# Patient Record
Sex: MALE | Race: WHITE | NOT HISPANIC OR LATINO | Employment: FULL TIME | URBAN - METROPOLITAN AREA
[De-identification: names, ages, dates, MRNs, and addresses within clinical notes are randomized per-mention and may not be internally consistent; named-entity substitution may affect disease eponyms.]

---

## 2022-04-06 ENCOUNTER — OFFICE VISIT (OUTPATIENT)
Dept: FAMILY MEDICINE | Facility: CLINIC | Age: 22
End: 2022-04-06
Payer: COMMERCIAL

## 2022-04-06 VITALS
WEIGHT: 183.5 LBS | OXYGEN SATURATION: 97 % | TEMPERATURE: 98.6 F | HEIGHT: 68 IN | HEART RATE: 70 BPM | DIASTOLIC BLOOD PRESSURE: 66 MMHG | BODY MASS INDEX: 27.81 KG/M2 | SYSTOLIC BLOOD PRESSURE: 118 MMHG

## 2022-04-06 DIAGNOSIS — R21 RASH AND NONSPECIFIC SKIN ERUPTION: Primary | ICD-10-CM

## 2022-04-06 PROCEDURE — 99203 OFFICE O/P NEW LOW 30 MIN: CPT | Performed by: PHYSICIAN ASSISTANT

## 2022-04-06 RX ORDER — PREDNISONE 20 MG/1
20 TABLET ORAL DAILY
Qty: 7 TABLET | Refills: 0 | Status: SHIPPED | OUTPATIENT
Start: 2022-04-06 | End: 2022-06-03

## 2022-04-06 RX ORDER — CETIRIZINE HYDROCHLORIDE 10 MG/1
10 TABLET ORAL DAILY
Qty: 10 TABLET | Refills: 1 | Status: SHIPPED | OUTPATIENT
Start: 2022-04-06 | End: 2022-08-01

## 2022-04-06 ASSESSMENT — ENCOUNTER SYMPTOMS
CONSTITUTIONAL NEGATIVE: 1
EYE REDNESS: 1
EYE ITCHING: 1

## 2022-04-06 NOTE — PROGRESS NOTES
"  Assessment & Plan     Rash and nonspecific skin eruption  Worsening  - cetirizine (ZYRTEC) 10 MG tablet  Dispense: 10 tablet; Refill: 1  - predniSONE (DELTASONE) 20 MG tablet  Dispense: 7 tablet; Refill: 0    Follow-up if not gradually improved, or if worsening.           BMI:   Estimated body mass index is 27.9 kg/m  as calculated from the following:    Height as of this encounter: 1.727 m (5' 8\").    Weight as of this encounter: 83.2 kg (183 lb 8 oz).           No follow-ups on file.    ADITYA Guillen  [facility]    Christ Sargent is a 21 year old who presents for the following health issues     1-year-old presents to the clinic with complaint of foot skin rash  Its been present upwards of a month  Started in the axillae is pretty much clear there are now withdrawn the neck in the scalp and on the upper eyelids  He has not been sick he has had no fever no chills  No new contacts he thought maybe it was from a deodorant he was using  He has never had problems like this before  No contacts with similar rash    History of Present Illness       Reason for visit:  Rash  Symptom onset:  More than a month  Symptoms include:  Itchy, red, flaky skin patches  Symptom intensity:  Moderate  Symptom progression:  Worsening  Had these symptoms before:  No    He eats 2-3 servings of fruits and vegetables daily.He consumes 0 sweetened beverage(s) daily.He exercises with enough effort to increase his heart rate 60 or more minutes per day.  He exercises with enough effort to increase his heart rate 6 days per week.        Rash  Onset/Duration: couple of weeks  Description  Location: neck, eyelids, behind ears  Character: flakey  Itching: mild  Intensity:  mild  Progression of Symptoms:  worsening  Accompanying signs and symptoms:   Fever: no  Body aches or joint pain: no  Sore throat symptoms: no  Recent cold symptoms: no  History:           Previous episodes of similar rash: None  New " "exposures:  None  Recent travel: YES, December 2021  Exposure to similar rash: no  Precipitating or alleviating factors:   Therapies tried and outcome: hydrocortisone cream -          Review of Systems   Constitutional: Negative.    HENT: Negative.    Eyes: Positive for redness and itching.   Skin: Positive for rash.            Objective    /66 (BP Location: Right arm)   Pulse 70   Temp 98.6  F (37  C) (Tympanic)   Ht 1.727 m (5' 8\")   Wt 83.2 kg (183 lb 8 oz)   SpO2 97%   BMI 27.90 kg/m    Body mass index is 27.9 kg/m .  Physical Exam erythematous macular eruption on the superior eyelids left greater than right more medial than lateral there is no flaking similar erythematous macular eruption in the scalp in the postauricular areas on the neck minimal right axilla none in the left axilla not on the trunk or extremities                 "

## 2022-05-29 ENCOUNTER — HEALTH MAINTENANCE LETTER (OUTPATIENT)
Age: 22
End: 2022-05-29

## 2022-06-03 ENCOUNTER — OFFICE VISIT (OUTPATIENT)
Dept: FAMILY MEDICINE | Facility: CLINIC | Age: 22
End: 2022-06-03
Payer: COMMERCIAL

## 2022-06-03 VITALS
BODY MASS INDEX: 26.47 KG/M2 | HEART RATE: 92 BPM | RESPIRATION RATE: 12 BRPM | OXYGEN SATURATION: 98 % | WEIGHT: 174.1 LBS | SYSTOLIC BLOOD PRESSURE: 142 MMHG | DIASTOLIC BLOOD PRESSURE: 72 MMHG

## 2022-06-03 DIAGNOSIS — L20.9 ATOPIC DERMATITIS, UNSPECIFIED TYPE: Primary | ICD-10-CM

## 2022-06-03 PROCEDURE — 99214 OFFICE O/P EST MOD 30 MIN: CPT | Performed by: PHYSICIAN ASSISTANT

## 2022-06-03 RX ORDER — PREDNISONE 20 MG/1
20 TABLET ORAL DAILY
Qty: 10 TABLET | Refills: 1 | Status: SHIPPED | OUTPATIENT
Start: 2022-06-03 | End: 2022-06-23

## 2022-06-03 ASSESSMENT — ENCOUNTER SYMPTOMS
NAUSEA: 0
FATIGUE: 0
ACTIVITY CHANGE: 0
CHILLS: 0
HEADACHES: 0
SHORTNESS OF BREATH: 0
FEVER: 0
PALPITATIONS: 0
ABDOMINAL PAIN: 0
CONSTIPATION: 0
LIGHT-HEADEDNESS: 0
VOMITING: 0
COUGH: 0
DIARRHEA: 0
DIZZINESS: 0

## 2022-06-03 ASSESSMENT — PAIN SCALES - GENERAL: PAINLEVEL: NO PAIN (0)

## 2022-06-03 NOTE — PROGRESS NOTES
Progress Note  6/03/22     Chief Complaint   Patient presents with     Derm Problem     Itchy rash in armpits, neck, scalp, and eye lids. Rash gets scaly. Has tried cortisone 10 and antifungal. Was seen in [place] and got prescriptions. Prednisone almost made it go away but was done with prescription before it was completely gone.         ISABELA Fuentes is a pleasant  21 year old year old male  who present to the clinic today for evaluation on a rash.  He states he has had a rash for about 6 to 9 months.  He states he started in his right armpit and more recently it is now spread to the back of his neck and he has some lesions on his scalp.  He thought the rash was initially from a chemical burn from his deodorant but he has switched deodorants and the rash is still there.  He has not had any new soaps lotions or detergents.  No recent travel.  No new dog.  No fevers or chills.  No other associated symptoms with the rash.  He states the rash is very itchy.  He was prescribed prednisone and Claritin the rash only is fully improved with prednisone but once he stopped the prednisone the rash returned.    ROS    Review of Systems   Constitutional: Negative for activity change, chills, fatigue and fever.   HENT: Negative.    Respiratory: Negative for cough and shortness of breath.    Cardiovascular: Negative for chest pain and palpitations.   Gastrointestinal: Negative for abdominal pain, constipation, diarrhea, nausea and vomiting.   Skin: Positive for rash.        Itchy rash to right armpit, neck, and scalp.  Noticed approximately 6 to 9 months ago.   Neurological: Negative for dizziness, light-headedness and headaches.            There is no problem list on file for this patient.       No past medical history on file.       Social History     Socioeconomic History     Marital status: Single     Spouse name: Not on file     Number of children: Not on file     Years of education: Not on file     Highest  education level: Not on file   Occupational History     Not on file   Tobacco Use     Smoking status: Never Smoker     Smokeless tobacco: Never Used   Substance and Sexual Activity     Alcohol use: Not on file     Drug use: Not on file     Sexual activity: Not on file   Other Topics Concern     Not on file   Social History Narrative     Not on file     Social Determinants of Health     Financial Resource Strain: Not on file   Food Insecurity: Not on file   Transportation Needs: Not on file   Physical Activity: Not on file   Stress: Not on file   Social Connections: Not on file   Intimate Partner Violence: Not on file   Housing Stability: Not on file         No Known Allergies       Current Outpatient Medications   Medication     predniSONE (DELTASONE) 20 MG tablet     cetirizine (ZYRTEC) 10 MG tablet     predniSONE (DELTASONE) 20 MG tablet     No current facility-administered medications for this visit.            BP (!) 142/72 (BP Location: Left arm, Patient Position: Sitting, Cuff Size: Adult Regular)   Pulse 92   Resp 12   Wt 79 kg (174 lb 1.6 oz)   SpO2 98%   BMI 26.47 kg/m       No results found for this or any previous visit (from the past 240 hour(s)).         No flowsheet data found.     Physical Exam:     Physical Exam  Vitals and nursing note reviewed.   Constitutional:       General: He is awake. He is not in acute distress.     Appearance: Normal appearance. He is well-developed and well-groomed. He is not ill-appearing, toxic-appearing or diaphoretic.   HENT:      Head: Normocephalic and atraumatic.   Eyes:      General: Lids are normal.         Right eye: No discharge.         Left eye: No discharge.      Conjunctiva/sclera: Conjunctivae normal.   Neck:      Trachea: Trachea normal.   Cardiovascular:      Rate and Rhythm: Normal rate and regular rhythm.      Heart sounds: S1 normal and S2 normal.     No friction rub. No gallop.   Pulmonary:      Effort: Pulmonary effort is normal. No accessory  muscle usage, prolonged expiration or respiratory distress.      Breath sounds: Normal breath sounds and air entry.   Musculoskeletal:      Cervical back: Full passive range of motion without pain and neck supple.   Lymphadenopathy:      Cervical: No cervical adenopathy.   Skin:     Comments: Erythematous none raised rash to right axilla, posterior neck.    Scaly plaque to posterior scalp bilaterally.   Neurological:      General: No focal deficit present.      Mental Status: He is alert and oriented to person, place, and time.      GCS: GCS eye subscore is 4. GCS verbal subscore is 5. GCS motor subscore is 6.      Cranial Nerves: Cranial nerves are intact.      Motor: No weakness.      Gait: Gait is intact.   Psychiatric:         Behavior: Behavior is cooperative.              Assessment/Plan:       ICD-10-CM    1. Atopic dermatitis, unspecified type  L20.9 predniSONE (DELTASONE) 20 MG tablet       #1 contact dermatitis- it does appear that he has a contact dermatitis to his right axillary region, and the back of his neck.  Etiology unclear.  The rash is erythematous and scaly.  Very itchy and does have some mild excoriation to it due to itching.  Since the prednisone did not improve I will prescribe him 40 mg x 5 days and with 1 refill if this returns within a week.  If he finishes the 5 mg I would like him to wait at least 5 to 7 days and then repeat another 40 mg x 5 days.  Also can continue Claritin or use topical Benadryl for the itching.    #2 psoriasis-the 2 lesions on the back of his head looks consistent with possibly psoriasis or severe eczema.  I was hoping that the prednisone helps cool this down as well.  He is not having any associated joint pain or other any other symptoms.    He is to monitor symptoms if symptoms worsen or do not improve he is to let me know.  Patient agreed to this plan.  Questions were answered       Marcelino Cook PA-C

## 2022-06-20 DIAGNOSIS — L20.9 ATOPIC DERMATITIS, UNSPECIFIED TYPE: Primary | ICD-10-CM

## 2022-06-24 ENCOUNTER — OFFICE VISIT (OUTPATIENT)
Dept: URGENT CARE | Facility: URGENT CARE | Age: 22
End: 2022-06-24
Payer: COMMERCIAL

## 2022-06-24 VITALS
RESPIRATION RATE: 14 BRPM | WEIGHT: 169.3 LBS | SYSTOLIC BLOOD PRESSURE: 118 MMHG | DIASTOLIC BLOOD PRESSURE: 58 MMHG | HEART RATE: 68 BPM | TEMPERATURE: 98.1 F | OXYGEN SATURATION: 99 % | BODY MASS INDEX: 25.74 KG/M2

## 2022-06-24 DIAGNOSIS — L20.9 ATOPIC DERMATITIS, UNSPECIFIED TYPE: ICD-10-CM

## 2022-06-24 DIAGNOSIS — R59.9 SWELLING OF LYMPH NODE: Primary | ICD-10-CM

## 2022-06-24 DIAGNOSIS — R21 RASH: ICD-10-CM

## 2022-06-24 PROCEDURE — 99214 OFFICE O/P EST MOD 30 MIN: CPT | Performed by: PHYSICIAN ASSISTANT

## 2022-06-24 PROCEDURE — 85025 COMPLETE CBC W/AUTO DIFF WBC: CPT | Performed by: PHYSICIAN ASSISTANT

## 2022-06-24 PROCEDURE — 36415 COLL VENOUS BLD VENIPUNCTURE: CPT | Performed by: PHYSICIAN ASSISTANT

## 2022-06-24 RX ORDER — CEPHALEXIN 500 MG/1
500 CAPSULE ORAL 4 TIMES DAILY
Qty: 28 CAPSULE | Refills: 0 | Status: SHIPPED | OUTPATIENT
Start: 2022-06-24 | End: 2022-07-01

## 2022-06-25 LAB
BASOPHILS # BLD AUTO: 0 10E3/UL (ref 0–0.2)
BASOPHILS NFR BLD AUTO: 0 %
EOSINOPHIL # BLD AUTO: 0.1 10E3/UL (ref 0–0.7)
EOSINOPHIL NFR BLD AUTO: 1 %
ERYTHROCYTE [DISTWIDTH] IN BLOOD BY AUTOMATED COUNT: 16.6 % (ref 10–15)
HCT VFR BLD AUTO: 45.2 % (ref 40–53)
HGB BLD-MCNC: 13.5 G/DL (ref 13.3–17.7)
IMM GRANULOCYTES # BLD: 0.1 10E3/UL
IMM GRANULOCYTES NFR BLD: 2 %
LYMPHOCYTES # BLD AUTO: 2.1 10E3/UL (ref 0.8–5.3)
LYMPHOCYTES NFR BLD AUTO: 24 %
MCH RBC QN AUTO: 20.8 PG (ref 26.5–33)
MCHC RBC AUTO-ENTMCNC: 29.9 G/DL (ref 31.5–36.5)
MCV RBC AUTO: 70 FL (ref 78–100)
MONOCYTES # BLD AUTO: 0.8 10E3/UL (ref 0–1.3)
MONOCYTES NFR BLD AUTO: 10 %
NEUTROPHILS # BLD AUTO: 5.3 10E3/UL (ref 1.6–8.3)
NEUTROPHILS NFR BLD AUTO: 63 %
NRBC # BLD AUTO: 0 10E3/UL
NRBC BLD AUTO-RTO: 0 /100
PLATELET # BLD AUTO: 153 10E3/UL (ref 150–450)
RBC # BLD AUTO: 6.49 10E6/UL (ref 4.4–5.9)
WBC # BLD AUTO: 8.5 10E3/UL (ref 4–11)

## 2022-06-25 NOTE — PROGRESS NOTES
Assessment & Plan     1. Swelling of lymph node  - CBC with platelets and differential; Future  - CBC with platelets and differential  - cephALEXin (KEFLEX) 500 MG capsule; Take 1 capsule (500 mg) by mouth 4 times daily for 7 days  Dispense: 28 capsule; Refill: 0    2. Atopic dermatitis, unspecified type      3. Scalp rash    Patient with scalp rash for months, has trialed prednisone, but does not appear to be helped. Now, for the past month or so, he has had lymph node swelling. He certainly has posterior auricular swelling, along with what appears to be occipital lymph nodes swelling. There is a chance that this is another skin lesion/mass/cyst. He has slight erythema of the scalp and lymph nodes are tender. Will treat with keflex for suspected infection. I suspect the rash is what is causing his lymph node swelling.  He has a history of lymph node swelling that have continued to be enlarged.  A WBC count was obtained and does not have evidence of lymphocytosis, anemia etc. Platelets were flagged, and lab is under slide review and has not been finalized.  Discussed obtaining imaging on the area, although US is somewhat difficult on the scalp, will defer to dermatology.   He has appointment scheduled, but it is not for several months.   If not improving with Keflex, advised follow-up with PCP in the following week for a recheck.         Return in about 1 week (around 7/1/2022) for Dermatology.    Diagnosis and treatment plan was reviewed with patient and/or family.   We went over any labs or imaging. Discussed worsening symptoms or little to no relief despite treatment plan to follow-up with PCP.  Patient verbalizes understanding. All questions were addressed and answered.     Gloria Spicer PA-C  [facility]    CHIEF COMPLAINT:   Chief Complaint   Patient presents with     Hair/Scalp Problem     Bumps on the back of his head, noticed month ago, 2 weeks ago started to be painful and get  jeana, also has rash  for 2 months that is moving to his neck     Subjective     Arie is a 21 year old male who presents to clinic today for evaluation of rash and lymph node swelling.  He has had a rash on the back of his neck and scalp for the past several months.  He has tried prednisone, steroid cream, antihistamines and antifungals for the rash. The cause of the rash is unclear. He has a follow-up appointment with dermatology.   For the past one month he has had lymph node swelling. Located behind his ears and at the base of his skull. Area is swollen and tender. He notes that when he was taking prednisone, that the swelling decreased He has not had fever, chills, recent illness, chest pain SOB. No unintentional weight loss.       No past medical history on file.  No past surgical history on file.  Social History     Tobacco Use     Smoking status: Never Smoker     Smokeless tobacco: Never Used   Substance Use Topics     Alcohol use: Not on file     Current Outpatient Medications   Medication     cephALEXin (KEFLEX) 500 MG capsule     cetirizine (ZYRTEC) 10 MG tablet     No current facility-administered medications for this visit.     No Known Allergies    10 point ROS of systems were all negative except for pertinent positives noted in my HPI.      Exam:   /58   Pulse 68   Temp 98.1  F (36.7  C) (Tympanic)   Resp 14   Wt 76.8 kg (169 lb 4.8 oz)   SpO2 99%   BMI 25.74 kg/m    Constitutional: healthy, alert and no distress  Head: Normocephalic, atraumatic.  Eyes: conjunctiva clear, no drainage  ENT: TMs clear and shiny caterina, nasal mucosa pink and moist, throat without tonsillar hypertrophy or erythema  Neck: neck is supple, +posterior cervical lymphadenopathy. NO nuchal rigidity  Lymph: He has swollen tender lymph nodes in posterior auricular, posterior cervical and occipital area. Nodes are mobile, scalp is slightly erythematous. Nodes approximately 1 cm in diameter and mobile.   Cardiovascular:  RRR  Respiratory: CTA bilaterally, no rhonchi or rales  Skin / scalp: scaly plaque on an erythematous base noted on scalp.   Neurologic: Speech clear, gait normal. Moves all extremities.    Results for orders placed or performed in visit on 06/24/22   CBC with platelets and differential     Status: None (In process)    Narrative    The following orders were created for panel order CBC with platelets and differential.  Procedure                               Abnormality         Status                     ---------                               -----------         ------                     CBC with platelets and d...[[id number]]                      In process                   Please view results for these tests on the individual orders.

## 2022-07-02 DIAGNOSIS — R59.1 LYMPHADENOPATHY: Primary | ICD-10-CM

## 2022-07-12 ENCOUNTER — MYC MEDICAL ADVICE (OUTPATIENT)
Dept: FAMILY MEDICINE | Facility: CLINIC | Age: 22
End: 2022-07-12

## 2022-07-12 DIAGNOSIS — R59.1 LYMPHADENOPATHY: Primary | ICD-10-CM

## 2022-07-13 ENCOUNTER — APPOINTMENT (OUTPATIENT)
Dept: URBAN - METROPOLITAN AREA CLINIC 260 | Age: 22
Setting detail: DERMATOLOGY
End: 2022-07-15

## 2022-07-13 VITALS — WEIGHT: 165 LBS | HEIGHT: 68 IN

## 2022-07-13 DIAGNOSIS — L21.8 OTHER SEBORRHEIC DERMATITIS: ICD-10-CM

## 2022-07-13 PROBLEM — L30.9 DERMATITIS, UNSPECIFIED: Status: ACTIVE | Noted: 2022-07-13

## 2022-07-13 PROCEDURE — 99204 OFFICE O/P NEW MOD 45 MIN: CPT

## 2022-07-13 PROCEDURE — OTHER PRESCRIPTION: OTHER

## 2022-07-13 PROCEDURE — OTHER PRESCRIPTION MEDICATION MANAGEMENT: OTHER

## 2022-07-13 PROCEDURE — OTHER MIPS QUALITY: OTHER

## 2022-07-13 PROCEDURE — OTHER COUNSELING: OTHER

## 2022-07-13 RX ORDER — KETOCONAZOLE 20 MG/ML
2% SHAMPOO, SUSPENSION TOPICAL QD
Qty: 120 | Refills: 2 | Status: CANCELLED | COMMUNITY
Start: 2022-07-13

## 2022-07-13 RX ORDER — DESONIDE 0.5 MG/G
0.05% OINTMENT TOPICAL BID
Qty: 15 | Refills: 0 | Status: ERX | COMMUNITY
Start: 2022-07-13

## 2022-07-13 RX ORDER — BETAMETHASONE DIPROPIONATE 0.5 MG/ML
0.05% LOTION TOPICAL TWICE DAILY
Qty: 60 | Refills: 1 | Status: ERX | COMMUNITY
Start: 2022-07-13

## 2022-07-13 ASSESSMENT — SEVERITY ASSESSMENT: HOW SEVERE IS THIS PATIENT'S CONDITION?: SEVERE

## 2022-07-13 ASSESSMENT — LOCATION SIMPLE DESCRIPTION DERM: LOCATION SIMPLE: RIGHT SCALP

## 2022-07-13 ASSESSMENT — LOCATION ZONE DERM: LOCATION ZONE: SCALP

## 2022-07-13 ASSESSMENT — LOCATION DETAILED DESCRIPTION DERM: LOCATION DETAILED: RIGHT MEDIAL FRONTAL SCALP

## 2022-07-13 NOTE — PROCEDURE: MIPS QUALITY
Quality 431: Preventive Care And Screening: Unhealthy Alcohol Use - Screening: Patient not identified as an unhealthy alcohol user when screened for unhealthy alcohol use using a systematic screening method
Quality 130: Documentation Of Current Medications In The Medical Record: Current Medications Documented
Detail Level: Detailed
Quality 110: Preventive Care And Screening: Influenza Immunization: Influenza Immunization Ordered or Recommended, but not Administered due to system reason
Quality 226: Preventive Care And Screening: Tobacco Use: Screening And Cessation Intervention: Patient screened for tobacco use, is a smoker AND received Cessation Counseling within the Previous 12 Months

## 2022-07-13 NOTE — PROCEDURE: PRESCRIPTION MEDICATION MANAGEMENT
Initiate Treatment: Ketoconazole 2% shampoo, Betamethasone lotion, and Desonide ointment
Render In Strict Bullet Format?: No
Detail Level: Simple
Plan: Return in 1 month for recheck

## 2022-07-14 ENCOUNTER — HOSPITAL ENCOUNTER (OUTPATIENT)
Dept: ULTRASOUND IMAGING | Facility: CLINIC | Age: 22
Discharge: HOME | End: 2022-07-14
Attending: PHYSICIAN ASSISTANT | Admitting: PHYSICIAN ASSISTANT
Payer: COMMERCIAL

## 2022-07-14 DIAGNOSIS — R59.1 LYMPHADENOPATHY: ICD-10-CM

## 2022-07-14 PROCEDURE — 76536 US EXAM OF HEAD AND NECK: CPT

## 2022-07-15 DIAGNOSIS — R59.1 LYMPHADENOPATHY: Primary | ICD-10-CM

## 2022-07-21 NOTE — TELEPHONE ENCOUNTER
FUTURE VISIT INFORMATION      FUTURE VISIT INFORMATION:    Date: 8/1/22    Time: 10:20AM    Location: [facility]  REFERRAL INFORMATION:    Referring provider:  Marcelino Cook PA-C    Referring providers clinic:  [facility]    Reason for visit/diagnosis  Lymphadenopathy [R59.1] referred by Marcelino Cook PA-C in [facility]    RECORDS REQUESTED FROM:       Clinic name Comments Records Status Imaging Status   Imaging 7/14/22 US Head Neck  Epic PACS   [facility] 6/24/22 note from Marcelino Cook PA-C EPIC

## 2022-08-01 ENCOUNTER — OFFICE VISIT (OUTPATIENT)
Dept: OTOLARYNGOLOGY | Facility: CLINIC | Age: 22
End: 2022-08-01
Payer: COMMERCIAL

## 2022-08-01 ENCOUNTER — PRE VISIT (OUTPATIENT)
Dept: OTOLARYNGOLOGY | Facility: CLINIC | Age: 22
End: 2022-08-01

## 2022-08-01 VITALS
WEIGHT: 160 LBS | TEMPERATURE: 98.2 F | HEART RATE: 76 BPM | BODY MASS INDEX: 24.25 KG/M2 | SYSTOLIC BLOOD PRESSURE: 107 MMHG | DIASTOLIC BLOOD PRESSURE: 72 MMHG | HEIGHT: 68 IN

## 2022-08-01 DIAGNOSIS — R59.1 LYMPHADENOPATHY: Primary | ICD-10-CM

## 2022-08-01 PROCEDURE — 99203 OFFICE O/P NEW LOW 30 MIN: CPT | Performed by: REGISTERED NURSE

## 2022-08-01 ASSESSMENT — PATIENT HEALTH QUESTIONNAIRE - PHQ9: SUM OF ALL RESPONSES TO PHQ QUESTIONS 1-9: 23

## 2022-08-01 ASSESSMENT — PAIN SCALES - GENERAL: PAINLEVEL: NO PAIN (0)

## 2022-08-01 NOTE — LETTER
8/1/2022       RE: Arie Fuentes  [address]   [address]  [place] [address]     Dear Colleague,    Thank you for referring your patient, Arie Fuentes, to the [facility] [place] at [facility]. Please see a copy of my visit note below.    [facility]   Head and Neck Surgery  August 1, 2022     HPI: Arie Fuentes is a 21 year old male who presents today for evaluation of lymphadenopathy of the neck. Patient has a history of an enlarged lymph node of the right neck that has been present for over 10 years. It has not changed in size. New occipital lymphadenopathy that has been present for a few months. Patient has also had a skin rash on scalp and is followed by dermatology. US completed on 7/14/22 that demonstrated increased number of lymph nodes within the posterior reticular soft tissues bilaterally, with normal preserved morphology. Patient reports that this lymphadenopathy has improved over the past few weeks.     Patient denies any dysphagia, odynophagia, new sore throat, mouth pain, ear pain, hemoptysis, voice changes or fever/chills. 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 Patient has a 10 month old dog but has never been scratched/bit. Reports persistent nasal drainage that occurred a couple months ago but has resolved. No headaches, epistaxis, or blurred vision.     Past Medical History:  No past medical history on file.    Past Surgical History:  No past surgical history on file.    Medications:  Current Outpatient Medications   Medication Sig Dispense Refill     cetirizine (ZYRTEC) 10 MG tablet Take 1 tablet (10 mg) by mouth daily (Patient not taking: No sig reported) 10 tablet 1     Allergies:  No Known Allergies     Social History:  Social History     Tobacco Use     Smoking status: Never Smoker     Smokeless  "tobacco: Never Used     ROS: 10 point ROS neg other than the symptoms noted above in the HPI.    Physical Exam:    /72 (BP Location: Left arm, Patient Position: Sitting, Cuff Size: Adult Regular)   Pulse 76   Ht 1.727 m (5' 8\")   Wt 72.6 kg (160 lb)   BMI 24.33 kg/m       Constitutional:  The patient was unaccompanied, well-groomed, and in no acute distress.     Neurologic: Alert and oriented x 3.  CN's III-XII within normal limits.  Voice normal.   Psychiatric: The patient's affect was calm, cooperative, and appropriate.     Communication:  Normal; communicates verbally, normal voice quality.   Respiratory: Breathing comfortably without stridor or exertion of accessory muscles.   Head/Face:  Normocephalic and atraumatic.  No lesions or scars.   Salivary glands -  Normal size, no tenderness, swelling, or palpable masses    Ears: Pinnae and tragus non-tender.  EAC's and TM's were clear.    Oral Cavity: Normal tongue, floor of mouth, buccal mucosa, and palate.  No lesions or masses on inspection.    Oropharynx: Normal mucosa, palate symmetric with normal elevation. No abnormal lymph tissue in the oropharynx.    Neck: Supple with normal laryngeal and tracheal landmarks.  The parotid beds were without masses.  No palpable thyroid.  Normal range of motion.    Lymphatic: 1.5 cm palpable, mobile, non-tender lymph node in right level IIb. No other palpable lymphadenopathy in the the neck.      Labs and Imaging Reviewed:  Imaging:   US 7/14/22  IMPRESSION:  1.  Increased number of lymph nodes within the posterior reticular soft tissues bilaterally, with normal preserved morphology. The largest on the right measures 1.3 x 1.3 x 0.4 cm largest on the left measures 1.1 x 0.9 x 0.5 cm  2.  Prominent lymph node in the right suboccipital region measuring 1.8 x 1.6 x 0.7 cm with normal preserved morphology    Assessment/Plan:  1. Lymphadenopathy  Patient with persistent right lymphadenopathy that has been present over 10 " years. Lymph node has not changed and does not have any concerning features on clinic exam. US results are also reassuring that this is a reactive node.     Suspect that previous lymphadenopathy that occurred a few months ago was due to skin irritation that remains present. Recommend that patient continue to follow with dermatology for management of skin irritation.     Recommend that patient monitor lymph node at this time. Return to clinic for further assessment if lymphadenopathy worsens or symptoms of right lymph node changes including new pain or enlargement of node.     Lizeth Gupta DNP, APRN, CNP  Otolaryngology  Head & Neck Surgery  [phone]    30 minutes spent on the date of the encounter doing chart review, history and exam, documentation and further activities per the note.      Again, thank you for allowing me to participate in the care of your patient.      Sincerely,    Kelly Gupta, NP

## 2022-08-01 NOTE — LETTER
Date:August 11, 2022      Patient was self referred, no letter generated. Do not send.        [facility] Health Information

## 2022-08-01 NOTE — PATIENT INSTRUCTIONS
- You were seen in the ENT Clinic today by Lizeth Gupta NP.   - Schedule follow up ultrasound of neck in 6 months to monitor lymph nodes. You can call Imaging Scheduling at [phone].  - Please send a [facility] message or call the ENT clinic at [phone] with any questions or concerns.

## 2022-08-01 NOTE — PROGRESS NOTES
"[facility]   Head and Neck Surgery  August 1, 2022     HPI: Arie Fuentes is a 21 year old male who presents today for evaluation of lymphadenopathy of the neck. Patient has a history of an enlarged lymph node of the right neck that has been present for over 10 years. It has not changed in size. New occipital lymphadenopathy that has been present for a few months. Patient has also had a skin rash on scalp and is followed by dermatology. US completed on 7/14/22 that demonstrated increased number of lymph nodes within the posterior reticular soft tissues bilaterally, with normal preserved morphology. Patient reports that this lymphadenopathy has improved over the past few weeks.     Patient denies any dysphagia, odynophagia, new sore throat, mouth pain, ear pain, hemoptysis, voice changes or fever/chills. 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 Patient has a 10 month old dog but has never been scratched/bit. Reports persistent nasal drainage that occurred a couple months ago but has resolved. No headaches, epistaxis, or blurred vision.     Past Medical History:  No past medical history on file.    Past Surgical History:  No past surgical history on file.    Medications:  Current Outpatient Medications   Medication Sig Dispense Refill     cetirizine (ZYRTEC) 10 MG tablet Take 1 tablet (10 mg) by mouth daily (Patient not taking: No sig reported) 10 tablet 1     Allergies:  No Known Allergies     Social History:  Social History     Tobacco Use     Smoking status: Never Smoker     Smokeless tobacco: Never Used     ROS: 10 point ROS neg other than the symptoms noted above in the HPI.    Physical Exam:    /72 (BP Location: Left arm, Patient Position: Sitting, Cuff Size: Adult Regular)   Pulse 76   Ht 1.727 m (5' 8\")   Wt 72.6 kg (160 lb)   BMI 24.33 kg/m       Constitutional:  The patient was unaccompanied, " well-groomed, and in no acute distress.     Neurologic: Alert and oriented x 3.  CN's III-XII within normal limits.  Voice normal.   Psychiatric: The patient's affect was calm, cooperative, and appropriate.     Communication:  Normal; communicates verbally, normal voice quality.   Respiratory: Breathing comfortably without stridor or exertion of accessory muscles.   Head/Face:  Normocephalic and atraumatic.  No lesions or scars.   Salivary glands -  Normal size, no tenderness, swelling, or palpable masses    Ears: Pinnae and tragus non-tender.  EAC's and TM's were clear.    Oral Cavity: Normal tongue, floor of mouth, buccal mucosa, and palate.  No lesions or masses on inspection.    Oropharynx: Normal mucosa, palate symmetric with normal elevation. No abnormal lymph tissue in the oropharynx.    Neck: Supple with normal laryngeal and tracheal landmarks.  The parotid beds were without masses.  No palpable thyroid.  Normal range of motion.    Lymphatic: 1.5 cm palpable, mobile, non-tender lymph node in right level IIb. No other palpable lymphadenopathy in the the neck.      Labs and Imaging Reviewed:  Imaging:   US 7/14/22  IMPRESSION:  1.  Increased number of lymph nodes within the posterior reticular soft tissues bilaterally, with normal preserved morphology. The largest on the right measures 1.3 x 1.3 x 0.4 cm largest on the left measures 1.1 x 0.9 x 0.5 cm  2.  Prominent lymph node in the right suboccipital region measuring 1.8 x 1.6 x 0.7 cm with normal preserved morphology    Assessment/Plan:  1. Lymphadenopathy  Patient with persistent right lymphadenopathy that has been present over 10 years. Lymph node has not changed and does not have any concerning features on clinic exam. US results are also reassuring that this is a reactive node.     Suspect that previous lymphadenopathy that occurred a few months ago was due to skin irritation that remains present. Recommend that patient continue to follow with  dermatology for management of skin irritation.     Recommend that patient monitor lymph node at this time. Return to clinic for further assessment if lymphadenopathy worsens or symptoms of right lymph node changes including new pain or enlargement of node.     Lizeth Gupta DNP, APRN, CNP  Otolaryngology  Head & Neck Surgery  [phone]    30 minutes spent on the date of the encounter doing chart review, history and exam, documentation and further activities per the note.

## 2022-08-04 ENCOUNTER — RX ONLY (RX ONLY)
Age: 22
End: 2022-08-04

## 2022-08-04 RX ORDER — KETOCONAZOLE 20 MG/ML
SHAMPOO, SUSPENSION TOPICAL
Qty: 120 | Refills: 2 | Status: ERX

## 2022-08-16 ENCOUNTER — APPOINTMENT (OUTPATIENT)
Dept: URBAN - METROPOLITAN AREA CLINIC 260 | Age: 22
Setting detail: DERMATOLOGY
End: 2022-08-17

## 2022-08-16 VITALS — WEIGHT: 160 LBS | HEIGHT: 68 IN

## 2022-08-16 DIAGNOSIS — L21.8 OTHER SEBORRHEIC DERMATITIS: ICD-10-CM

## 2022-08-16 DIAGNOSIS — L23.2 ALLERGIC CONTACT DERMATITIS DUE TO COSMETICS: ICD-10-CM

## 2022-08-16 PROCEDURE — OTHER ADDITIONAL NOTES: OTHER

## 2022-08-16 PROCEDURE — 99214 OFFICE O/P EST MOD 30 MIN: CPT

## 2022-08-16 PROCEDURE — OTHER MIPS QUALITY: OTHER

## 2022-08-16 PROCEDURE — OTHER COUNSELING: OTHER

## 2022-08-16 PROCEDURE — OTHER PRESCRIPTION: OTHER

## 2022-08-16 PROCEDURE — OTHER PRESCRIPTION MEDICATION MANAGEMENT: OTHER

## 2022-08-16 RX ORDER — CEPHALEXIN 500 MG/1
500MG TABLET ORAL BID
Qty: 28 | Refills: 0 | Status: ERX | COMMUNITY
Start: 2022-08-16

## 2022-08-16 RX ORDER — CLOBETASOL PROPIONATE 0.5 MG/ML
0.05% SOLUTION TOPICAL QHS
Qty: 50 | Refills: 1 | Status: ERX | COMMUNITY
Start: 2022-08-16

## 2022-08-16 ASSESSMENT — SEVERITY ASSESSMENT: HOW SEVERE IS THIS PATIENT'S CONDITION?: MODERATE

## 2022-08-16 ASSESSMENT — LOCATION SIMPLE DESCRIPTION DERM
LOCATION SIMPLE: LEFT AXILLARY VAULT
LOCATION SIMPLE: RIGHT AXILLARY VAULT

## 2022-08-16 ASSESSMENT — LOCATION DETAILED DESCRIPTION DERM
LOCATION DETAILED: LEFT AXILLARY VAULT
LOCATION DETAILED: RIGHT AXILLARY VAULT

## 2022-08-16 ASSESSMENT — LOCATION ZONE DERM: LOCATION ZONE: AXILLAE

## 2022-08-16 ASSESSMENT — SEVERITY ASSESSMENT 2020: SEVERITY 2020: ALMOST CLEAR

## 2022-08-16 ASSESSMENT — BSA RASH: BSA RASH: 1

## 2022-08-16 NOTE — PROCEDURE: ADDITIONAL NOTES
Detail Level: Simple
Render Risk Assessment In Note?: no
Additional Notes: He has had little luck finding a sensitive/aluminum free deoderant.  Recommended using vanicream deoderant as hypoallergenic and hopefully well tolerated for him.

## 2022-08-16 NOTE — PROCEDURE: PRESCRIPTION MEDICATION MANAGEMENT
Plan: Recommend olive or coconut oil to scales 15 minutes prior to shower in order to soften and loosen persistent scaling.
Discontinue Regimen: betamethasone, desonide and ketoconazole shampoo
Render In Strict Bullet Format?: No
Initiate Treatment: Clobetosol qhs and cephalexin tid x 10 days
Detail Level: Simple

## 2022-09-16 ENCOUNTER — MYC MEDICAL ADVICE (OUTPATIENT)
Dept: FAMILY MEDICINE | Facility: CLINIC | Age: 22
End: 2022-09-16

## 2022-09-16 NOTE — TELEPHONE ENCOUNTER
I would like to see him in clinic. I have a same day Tuesday 2:00 if this will work for him. If he wants to be seen sooner he would need to schedule with another provider.

## 2022-09-20 ENCOUNTER — OFFICE VISIT (OUTPATIENT)
Dept: FAMILY MEDICINE | Facility: CLINIC | Age: 22
End: 2022-09-20
Payer: COMMERCIAL

## 2022-09-20 ENCOUNTER — PATIENT OUTREACH (OUTPATIENT)
Dept: ONCOLOGY | Facility: CLINIC | Age: 22
End: 2022-09-20

## 2022-09-20 VITALS
BODY MASS INDEX: 25.23 KG/M2 | HEART RATE: 67 BPM | WEIGHT: 165.9 LBS | DIASTOLIC BLOOD PRESSURE: 68 MMHG | SYSTOLIC BLOOD PRESSURE: 108 MMHG | OXYGEN SATURATION: 100 %

## 2022-09-20 DIAGNOSIS — R59.0 CERVICAL LYMPHADENOPATHY: ICD-10-CM

## 2022-09-20 DIAGNOSIS — R59.1 LYMPHADENOPATHY: Primary | ICD-10-CM

## 2022-09-20 DIAGNOSIS — R59.0 INGUINAL LYMPHADENOPATHY: ICD-10-CM

## 2022-09-20 LAB
BASOPHILS # BLD AUTO: 0 10E3/UL (ref 0–0.2)
BASOPHILS NFR BLD AUTO: 0 %
EOSINOPHIL # BLD AUTO: 0.1 10E3/UL (ref 0–0.7)
EOSINOPHIL NFR BLD AUTO: 1 %
ERYTHROCYTE [DISTWIDTH] IN BLOOD BY AUTOMATED COUNT: 17 % (ref 10–15)
HCT VFR BLD AUTO: 46.8 % (ref 40–53)
HGB BLD-MCNC: 14 G/DL (ref 13.3–17.7)
IMM GRANULOCYTES # BLD: 0 10E3/UL
IMM GRANULOCYTES NFR BLD: 0 %
LYMPHOCYTES # BLD AUTO: 1.6 10E3/UL (ref 0.8–5.3)
LYMPHOCYTES NFR BLD AUTO: 21 %
MCH RBC QN AUTO: 20.3 PG (ref 26.5–33)
MCHC RBC AUTO-ENTMCNC: 29.9 G/DL (ref 31.5–36.5)
MCV RBC AUTO: 68 FL (ref 78–100)
MONOCYTES # BLD AUTO: 0.7 10E3/UL (ref 0–1.3)
MONOCYTES NFR BLD AUTO: 10 %
NEUTROPHILS # BLD AUTO: 5.2 10E3/UL (ref 1.6–8.3)
NEUTROPHILS NFR BLD AUTO: 68 %
NRBC # BLD AUTO: 0 10E3/UL
NRBC BLD AUTO-RTO: 0 /100
PLAT MORPH BLD: NORMAL
PLATELET # BLD AUTO: 174 10E3/UL (ref 150–450)
RBC # BLD AUTO: 6.89 10E6/UL (ref 4.4–5.9)
RBC MORPH BLD: NORMAL
WBC # BLD AUTO: 7.7 10E3/UL (ref 4–11)

## 2022-09-20 PROCEDURE — 85025 COMPLETE CBC W/AUTO DIFF WBC: CPT | Performed by: PHYSICIAN ASSISTANT

## 2022-09-20 PROCEDURE — 87591 N.GONORRHOEAE DNA AMP PROB: CPT | Performed by: PHYSICIAN ASSISTANT

## 2022-09-20 PROCEDURE — 36415 COLL VENOUS BLD VENIPUNCTURE: CPT | Performed by: PHYSICIAN ASSISTANT

## 2022-09-20 PROCEDURE — 99214 OFFICE O/P EST MOD 30 MIN: CPT | Performed by: PHYSICIAN ASSISTANT

## 2022-09-20 PROCEDURE — 87491 CHLMYD TRACH DNA AMP PROBE: CPT | Performed by: PHYSICIAN ASSISTANT

## 2022-09-20 ASSESSMENT — ANXIETY QUESTIONNAIRES
GAD7 TOTAL SCORE: 7
1. FEELING NERVOUS, ANXIOUS, OR ON EDGE: MORE THAN HALF THE DAYS
2. NOT BEING ABLE TO STOP OR CONTROL WORRYING: NOT AT ALL
7. FEELING AFRAID AS IF SOMETHING AWFUL MIGHT HAPPEN: NOT AT ALL
6. BECOMING EASILY ANNOYED OR IRRITABLE: NEARLY EVERY DAY
GAD7 TOTAL SCORE: 7
3. WORRYING TOO MUCH ABOUT DIFFERENT THINGS: NOT AT ALL
4. TROUBLE RELAXING: SEVERAL DAYS
5. BEING SO RESTLESS THAT IT IS HARD TO SIT STILL: SEVERAL DAYS
8. IF YOU CHECKED OFF ANY PROBLEMS, HOW DIFFICULT HAVE THESE MADE IT FOR YOU TO DO YOUR WORK, TAKE CARE OF THINGS AT HOME, OR GET ALONG WITH OTHER PEOPLE?: SOMEWHAT DIFFICULT
IF YOU CHECKED OFF ANY PROBLEMS ON THIS QUESTIONNAIRE, HOW DIFFICULT HAVE THESE PROBLEMS MADE IT FOR YOU TO DO YOUR WORK, TAKE CARE OF THINGS AT HOME, OR GET ALONG WITH OTHER PEOPLE: SOMEWHAT DIFFICULT
GAD7 TOTAL SCORE: 7
7. FEELING AFRAID AS IF SOMETHING AWFUL MIGHT HAPPEN: NOT AT ALL

## 2022-09-20 ASSESSMENT — ENCOUNTER SYMPTOMS
SHORTNESS OF BREATH: 0
DIAPHORESIS: 0
HEADACHES: 0
FEVER: 0
WHEEZING: 0
FATIGUE: 0
LIGHT-HEADEDNESS: 0
PALPITATIONS: 0
MUSCULOSKELETAL NEGATIVE: 1
CHILLS: 0
GASTROINTESTINAL NEGATIVE: 1
ACTIVITY CHANGE: 0
DIZZINESS: 0
COUGH: 0

## 2022-09-20 ASSESSMENT — PATIENT HEALTH QUESTIONNAIRE - PHQ9
SUM OF ALL RESPONSES TO PHQ QUESTIONS 1-9: 12
10. IF YOU CHECKED OFF ANY PROBLEMS, HOW DIFFICULT HAVE THESE PROBLEMS MADE IT FOR YOU TO DO YOUR WORK, TAKE CARE OF THINGS AT HOME, OR GET ALONG WITH OTHER PEOPLE: SOMEWHAT DIFFICULT
SUM OF ALL RESPONSES TO PHQ QUESTIONS 1-9: 12

## 2022-09-20 ASSESSMENT — PAIN SCALES - GENERAL: PAINLEVEL: NO PAIN (0)

## 2022-09-20 NOTE — PROGRESS NOTES
Chief Complaint   Patient presents with     Health Maintenance     Follow up.        Assessment & Plan       ICD-10-CM    1. Lymphadenopathy  R59.1 Chlamydia trachomatis/Neisseria gonorrhoeae by PCR     CBC with platelets and differential     Adult Oncology/Hematology  Referral     CBC with platelets and differential   2. Cervical lymphadenopathy  R59.0 Chlamydia trachomatis/Neisseria gonorrhoeae by PCR   3. Inguinal lymphadenopathy  R59.0 Chlamydia trachomatis/Neisseria gonorrhoeae by PCR     Adult Oncology/Hematology  Referral     CBC with platelets and differential     #1 cervical lymphadenopathy  #2 inguinal lymphadenopathy  He has had cervical lymphadenopathy for greater than 4 to 5 months.  He is also more recently noticed inguinal bilateral lymphadenopathy adenopathy.  On exam he does have to to 3 enlarged lymph nodes on the left inguinal region and 2 on the right.  Are nontender and mobile.  He continues to have dermatitis as well and is closely monitoring with dermatology.  He did have a CBC 6/24/2022 which showed a hemoglobin of 13.5, MCV of 70, MCH of 20 MCHC of 29 and normal platelet count.  Today we will repeat a CBC, rule out gonorrhea and chlamydia as a contributing factor to his inguinal lymphadenopathy and have him follow-up with hematology/oncology for further evaluation.        No follow-ups on file.    [facility]   Arie is a 22 year old, presenting for the following health issues:  Health Maintenance (Follow up. )      Arie is a pleasant 22-year-old male that presents to the clinic today for reevaluation on lymphadenopathy.  He was initially seen in 6/2022 for bilateral cervical lymphadenopathy and dermatitis to the posterior aspect of his scalp.  He did undergo an ultrasound of his lymph nodes that showed Increased number of lymph nodes within the posterior reticular soft tissues bilaterally, with normal  preserved morphology. The largest on the right measures 1.3 x 1.3 x 0.4 cm largest on the left measures 1.1 x 0.9 x 0.5 cm. Prominent lymph node in the right suboccipital region measuring 1.8 x 1.6 x 0.7 cm with normal preserved morphology.  At that time he was also having issues with a rash to the posterior aspect of his scalp.  He was then sent to ENT and dermatology for evaluation.  ENT recommended close monitoring of the cervical lymphadenopathy and dermatology started him on ketoconazole shampoo along with a antibiotic.  He has not noticed any improvement in the cervical lymphadenopathy.  He has noticed some improvement in the rash.    Proximately 4 days ago he has now noticed inguinal lymphadenopathy bilaterally.  He states that the lymph nodes are not painful but they are getting larger since he has noticed them.  He denies any fevers or chills but he does state he has night sweats at times but this is very infrequent.    History of Present Illness       Reason for visit:  Lymph nodes     Today's PHQ-9         PHQ-9 Total Score: 12    PHQ-9 Q9 Thoughts of better off dead/self-harm past 2 weeks :   Not at all    How difficult have these problems made it for you to do your work, take care of things at home, or get along with other people: Somewhat difficult  Today's GALDINO-7 Score: 7       Review of Systems   Constitutional: Negative for activity change, chills, diaphoresis, fatigue and fever.        Enlarged lymph nodes to neck and groin region   HENT: Negative.    Respiratory: Negative for cough, shortness of breath and wheezing.    Cardiovascular: Negative for chest pain and palpitations.   Gastrointestinal: Negative.    Musculoskeletal: Negative.    Skin: Positive for rash.   Neurological: Negative for dizziness, light-headedness and headaches.         Objective    /68   Pulse 67   Wt 75.3 kg (165 lb 14.4 oz)   SpO2 100%   BMI 25.23 kg/m    Body mass index is 25.23 kg/m .  Physical Exam  Vitals and  nursing note reviewed. Exam conducted with a chaperone present.   Constitutional:       General: He is not in acute distress.     Appearance: Normal appearance. He is not ill-appearing, toxic-appearing or diaphoretic.   HENT:      Head: Normocephalic and atraumatic.   Eyes:      Conjunctiva/sclera: Conjunctivae normal.   Cardiovascular:      Rate and Rhythm: Normal rate and regular rhythm.      Heart sounds: No murmur heard.    No friction rub. No gallop.   Pulmonary:      Effort: Pulmonary effort is normal.      Breath sounds: No wheezing, rhonchi or rales.   Genitourinary:     Testes:         Right: Mass not present. Right testis is descended. Cremasteric reflex is present.          Left: Mass not present. Left testis is descended. Cremasteric reflex is present.    Musculoskeletal:      Cervical back: Neck supple.   Lymphadenopathy:      Cervical: Cervical adenopathy present.      Right cervical: Superficial cervical adenopathy present.      Left cervical: Superficial cervical adenopathy present.      Lower Body: Right inguinal adenopathy present. Left inguinal adenopathy present.      Comments: Bilateral cervical lymphadenopathy nontender and mobile    Bilateral inguinal lymphadenopathy bilaterally and mobile.   Neurological:      Mental Status: He is alert.

## 2022-09-20 NOTE — PROGRESS NOTES
New Patient Oncology Nurse Navigator Note     Referring provider:   Marcelino Cook PA-C [facility]     Referred to (specialty): Medical Oncology    Requested provider (if applicable): n/a     Date Referral Received: 2022     Evaluation for : lymphadenopathy     Clinical History (per Nurse review of records provided):    **BOOK MARKED**   NOTES:  About a 9-month history of dermatitis and enlarged lymph nodes  2022:  PCP most recent visit  2022:  Saw ENT for swollen neck lymph nodes  , 6/3 & 2022:  Saw PCP for same reason--dermatitis and enlarged lymph nodes    IMAGIN2022:  US Head Neck Soft Tissue    PATHOLOGY:  None to date--needs work up    Clinical Assessment / Barriers to Care (Per Nurse): none noted       Records Location (Care Everywhere, Media, etc.): [facility]     Records Needed: none     Additional testing needed prior to consult: none

## 2022-09-21 LAB
C TRACH DNA SPEC QL PROBE+SIG AMP: NEGATIVE
N GONORRHOEA DNA SPEC QL NAA+PROBE: NEGATIVE

## 2022-09-27 NOTE — PROGRESS NOTES
Hematology/Medical Oncology Consultation Note      September 29, 2022    Referring provider:  Marcelino Cook PA-C    ADDENDUM (10/29/2022):  Left message with patient regarding negative MR enterography.  Emphasized again that he has 1 gene deletion alpha thalassemia which should not be confused with iron deficiency anemia.  Our evaluation of his lymphadenopathy was satisfactory.  I recommended follow-up with us on an as-needed basis.    Dick Jones MD      ADDENDUM (10/15/2022):  Left message with patient regarding blood tests confirming one gene deletion alpha thalassemia which explains his microcytosis without anemia.  This has no health consequence. This condition should not be confused with iron deficiency.    In view of his CT findings, recommend MR enterography to evaluate his small bowel in more detail.  I will have the appointment and support staff try to contact him since he has not been available by phone several times.  I also asked him to call the office back if he had questions.  I will call him back with results of the MR enterography when results are available.    Dick Jones MD    ADDENDUM (10/7/2022): Left message with patient regarding unremarkable CT chest, abdomen and pelvis except for thickened loops of proximal jejunum.  All the blood tests were unremarkable.  Await outcome of globin gene analysis.  I will need to talk with him in person regarding the small bowel findings. Dick Jones MD    ADDENDUM (10/4/2022):  Call patient regarding negative screening hemoglobin electrophoresis.  Since his iron studies are borderline we will proceed with alpha globin analysis.  He will come to the clinic here cytogenetic consent form and we will have to submit a test request form along with a sample to the reference lab.  He understands. 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    Impression:  1. Mild, non-pathologic widespread lymphadenopathy, but associated with 6 month history of night sweats  2. Severe microcytosis; suspect thalassemia; r/o latent iron deficiency    Recommendation, plan, instructions:  1. CBC, smear, CMP, ESR, LDH, Hgb electrophoresis with reflex, iron/TIBC, ferritin  2. CT chest, abdomen and pelvis with and wo contrast  3.  I will call the patient back with test results and discussion about what to do next.    Time with patient including review, documentation, history, examination, coordination of care and counseling was 50 minutes.    History of present illness:  On 8/1/2022, the patient was self-referred and seen by Kelly Gupta at [facility] for a 10-year history of solitary right neck lymphadenopathy and new bilateral neck adenopathy.  Head/neck soft tissue ultrasound revealed increased number of lymph nodes with bilateral posterior cervical lymphadenopathy up to 1.3 cm and a 1.8 cm right suboccipital lymph node.  The provider felt that the ultrasound findings were consistent with a reactive lymph node and recommended patient monitoring with as needed follow-up.    On 9/20/2022, the patient was seen by his primary care provider with more recent bilateral inguinal lymphadenopathy with 3 cm left and 2 cm right inguinal lymph nodes palpated that were nontender and mobile.  CBC differential revealed a white count 7700, hemoglobin is 14.0, hematocrit 46.8, MCV 68, RDW 17.0% with a normal platelet count and differential.  Similar findings were seen in a previous 6/24/2022 CBC/platelet count.    He has had a cat in his home for just several weeks.  He denies any fever, chills or intentional weight loss.    Past medical history:  The patient has been evaluated for bilateral inguinal hernias but has otherwise been healthy.  He does not smoke or use alcohol although he does use electronic cigarettes.  He does use  "weed.    Family history:  His mother has endometriosis his father is in good health.  He has a full brother and a half brother who are in good health.  Family history is unremarkable.    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  He is single and lives with his girlfriend Yumiko who is a [employer] pulmonary rehab specialist.  They have had a dog.  He is not a  and denies foreign travel.  Medications:  No current outpatient medications on file.     No current facility-administered medications for this visit.       Allergies:  No Known Allergies    Review of systems:  Except as noted in the note above, the patient denies headaches, diplopia, hearing loss or dizziness; dyspnea, cough, hemoptysis, pleurisy; chest pain/pressure, palpitations, lightheadedness; anorexia, nausea, vomiting, abdominal pain, diarrhea, constipation, melena or rectal bleeding; dysuria, frequency, nocturia, blood in the urine; fever, chills, sweats; tingling, numbness, loss of balance; insomnia, depression, anxiety.    Physical examination:  /75 (BP Location: Left arm, Patient Position: Sitting, Cuff Size: Adult Regular)   Pulse 79   Ht 1.727 m (5' 8\")   Wt 76.6 kg (168 lb 12.8 oz)   SpO2 99%   BMI 25.67 kg/m       The patient is alert and oriented. Throat and oral mucosa are normal-appearing. Thyroid gland is not enlarged.  Bilateral posterior cervical lymphadenopathy is palpated measuring 4 to 6 mm.  There is a more prominent 1 cm right superior posterior cervical lymph node noted.  Bilateral 1 -1.5 cm inguinal and femoral lymphadenopathy is noted.  Lungs are clear to auscultation and percussion without rales, wheezes or rubs; heart rhythm is regular, heart sounds are without murmurs or gallops; the abdomen is soft and flat without hepatosplenomegaly, masses, ascites or tenderness.; extremities and skin reveal no unusual skin lesions, joint swelling or edema; there is a scaly erythematous rash over his hairline posteriorly " for which he has been seen by a skin provider on multiple occasions.  Examination of the testes and scrotum were unremarkable.      Petros Jones MD

## 2022-09-29 ENCOUNTER — ONCOLOGY VISIT (OUTPATIENT)
Dept: ONCOLOGY | Facility: CLINIC | Age: 22
End: 2022-09-29
Attending: PHYSICIAN ASSISTANT
Payer: COMMERCIAL

## 2022-09-29 ENCOUNTER — LAB (OUTPATIENT)
Dept: INFUSION THERAPY | Facility: CLINIC | Age: 22
End: 2022-09-29
Attending: PHYSICIAN ASSISTANT
Payer: COMMERCIAL

## 2022-09-29 VITALS
DIASTOLIC BLOOD PRESSURE: 75 MMHG | WEIGHT: 168.8 LBS | HEART RATE: 79 BPM | SYSTOLIC BLOOD PRESSURE: 122 MMHG | HEIGHT: 68 IN | BODY MASS INDEX: 25.58 KG/M2 | OXYGEN SATURATION: 99 %

## 2022-09-29 DIAGNOSIS — R71.8 MICROCYTOSIS: ICD-10-CM

## 2022-09-29 DIAGNOSIS — R61 NIGHT SWEATS: ICD-10-CM

## 2022-09-29 DIAGNOSIS — R59.0 INGUINAL LYMPHADENOPATHY: ICD-10-CM

## 2022-09-29 DIAGNOSIS — R59.1 LYMPHADENOPATHY: ICD-10-CM

## 2022-09-29 DIAGNOSIS — D56.0 ALPHA-THALASSEMIA (H): ICD-10-CM

## 2022-09-29 DIAGNOSIS — K52.9 JEJUNAL INFLAMMATION: ICD-10-CM

## 2022-09-29 DIAGNOSIS — R59.1 LYMPHADENOPATHY: Primary | ICD-10-CM

## 2022-09-29 LAB
ALBUMIN SERPL-MCNC: 4.2 G/DL (ref 3.5–5)
ALP SERPL-CCNC: 80 U/L (ref 45–120)
ALT SERPL W P-5'-P-CCNC: 30 U/L (ref 0–45)
ANION GAP SERPL CALCULATED.3IONS-SCNC: 9 MMOL/L (ref 5–18)
AST SERPL W P-5'-P-CCNC: 30 U/L (ref 0–40)
BASOPHILS # BLD AUTO: 0 10E3/UL (ref 0–0.2)
BASOPHILS NFR BLD AUTO: 0 %
BILIRUB SERPL-MCNC: 0.4 MG/DL (ref 0–1)
BUN SERPL-MCNC: 20 MG/DL (ref 8–22)
CALCIUM SERPL-MCNC: 9.4 MG/DL (ref 8.5–10.5)
CHLORIDE BLD-SCNC: 103 MMOL/L (ref 98–107)
CO2 SERPL-SCNC: 28 MMOL/L (ref 22–31)
CREAT SERPL-MCNC: 1.3 MG/DL (ref 0.7–1.3)
EOSINOPHIL # BLD AUTO: 0.1 10E3/UL (ref 0–0.7)
EOSINOPHIL NFR BLD AUTO: 1 %
ERYTHROCYTE [DISTWIDTH] IN BLOOD BY AUTOMATED COUNT: 15.9 % (ref 10–15)
ERYTHROCYTE [SEDIMENTATION RATE] IN BLOOD BY WESTERGREN METHOD: 1 MM/HR (ref 0–15)
GFR SERPL CREATININE-BSD FRML MDRD: 79 ML/MIN/1.73M2
GLUCOSE BLD-MCNC: 90 MG/DL (ref 70–125)
HCT VFR BLD AUTO: 44.4 % (ref 40–53)
HGB BLD-MCNC: 13.6 G/DL (ref 13.3–17.7)
IMM GRANULOCYTES # BLD: 0 10E3/UL
IMM GRANULOCYTES NFR BLD: 0 %
IRON BINDING CAPACITY (ROCHE): 308 UG/DL (ref 240–430)
IRON SATN MFR SERPL: 16 % (ref 15–46)
IRON SERPL-MCNC: 50 UG/DL (ref 61–157)
LDH SERPL L TO P-CCNC: 161 U/L (ref 125–220)
LYMPHOCYTES # BLD AUTO: 1.4 10E3/UL (ref 0.8–5.3)
LYMPHOCYTES NFR BLD AUTO: 19 %
MCH RBC QN AUTO: 20.6 PG (ref 26.5–33)
MCHC RBC AUTO-ENTMCNC: 30.6 G/DL (ref 31.5–36.5)
MCV RBC AUTO: 67 FL (ref 78–100)
MONOCYTES # BLD AUTO: 0.8 10E3/UL (ref 0–1.3)
MONOCYTES NFR BLD AUTO: 11 %
NEUTROPHILS # BLD AUTO: 5 10E3/UL (ref 1.6–8.3)
NEUTROPHILS NFR BLD AUTO: 69 %
NRBC # BLD AUTO: 0 10E3/UL
NRBC BLD AUTO-RTO: 0 /100
PLATELET # BLD AUTO: 201 10E3/UL (ref 150–450)
POTASSIUM BLD-SCNC: 4 MMOL/L (ref 3.5–5)
PROT SERPL-MCNC: 7 G/DL (ref 6–8)
RBC # BLD AUTO: 6.59 10E6/UL (ref 4.4–5.9)
RETICS # AUTO: 0.06 10E6/UL (ref 0.01–0.11)
RETICS/RBC NFR AUTO: 0.8 % (ref 0.8–2.7)
SODIUM SERPL-SCNC: 140 MMOL/L (ref 136–145)
WBC # BLD AUTO: 7.3 10E3/UL (ref 4–11)

## 2022-09-29 PROCEDURE — 80053 COMPREHEN METABOLIC PANEL: CPT

## 2022-09-29 PROCEDURE — 99204 OFFICE O/P NEW MOD 45 MIN: CPT | Performed by: INTERNAL MEDICINE

## 2022-09-29 PROCEDURE — 83615 LACTATE (LD) (LDH) ENZYME: CPT

## 2022-09-29 PROCEDURE — 36415 COLL VENOUS BLD VENIPUNCTURE: CPT

## 2022-09-29 PROCEDURE — 85652 RBC SED RATE AUTOMATED: CPT

## 2022-09-29 PROCEDURE — G0463 HOSPITAL OUTPT CLINIC VISIT: HCPCS

## 2022-09-29 PROCEDURE — 83020 HEMOGLOBIN ELECTROPHORESIS: CPT

## 2022-09-29 PROCEDURE — 85045 AUTOMATED RETICULOCYTE COUNT: CPT

## 2022-09-29 PROCEDURE — 85025 COMPLETE CBC W/AUTO DIFF WBC: CPT

## 2022-09-29 PROCEDURE — 82728 ASSAY OF FERRITIN: CPT

## 2022-09-29 PROCEDURE — 85060 BLOOD SMEAR INTERPRETATION: CPT | Performed by: PATHOLOGY

## 2022-09-29 PROCEDURE — 85660 RBC SICKLE CELL TEST: CPT

## 2022-09-29 PROCEDURE — 83550 IRON BINDING TEST: CPT

## 2022-09-29 ASSESSMENT — PAIN SCALES - GENERAL: PAINLEVEL: NO PAIN (0)

## 2022-09-29 NOTE — PATIENT INSTRUCTIONS
"Blood tests today(CBC, smear, ferritin, SI/TIBC, ESR, LDH, comprehensive metabolic panel, hemoglobin electrophoresis  CT scan chest, abdomen and pelvis to evaluate for internal lymph nodes  With your small red cells, we are looking for possible \"thalassemia\"  Dr. Jones will call you with test results and what to do next  "

## 2022-09-29 NOTE — PROGRESS NOTES
"Oncology Rooming Note    September 29, 2022 2:23 PM   Arie Fuentes is a 22 year old male who presents for:    Chief Complaint   Patient presents with     Oncology Clinic Visit     New Consult,  Lymphadenopathy,  Referred byMarcelino Cook PA-C     Initial Vitals: /75 (BP Location: Left arm, Patient Position: Sitting, Cuff Size: Adult Regular)   Pulse 79   Ht 1.727 m (5' 8\")   Wt 76.6 kg (168 lb 12.8 oz)   SpO2 99%   BMI 25.67 kg/m   Estimated body mass index is 25.67 kg/m  as calculated from the following:    Height as of this encounter: 1.727 m (5' 8\").    Weight as of this encounter: 76.6 kg (168 lb 12.8 oz). Body surface area is 1.92 meters squared.  No Pain (0) Comment: Data Unavailable   No LMP for male patient.  Allergies reviewed: Yes  Medications reviewed: Yes    Medications: Medication refills not needed today.  Pharmacy name entered into [employer]:    [id number] - [place] [address] [address] [facility] [address]  [facility][id number] - [address] AT [facility]    Clinical concerns: New Consult    Mona Miller MA            "

## 2022-09-29 NOTE — LETTER
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    Impression:  1. Mild, non-pathologic widespread lymphadenopathy, but associated with 6 month history of night sweats  2. Severe microcytosis; suspect thalassemia; r/o latent iron deficiency    Recommendation, plan, instructions:  1. CBC, smear, CMP, ESR, LDH, Hgb electrophoresis with reflex, iron/TIBC, ferritin  2. CT chest, abdomen and pelvis with and wo contrast  3.  I will call the patient back with test results and discussion about what to do next.    Time with patient including review, documentation, history, examination, coordination of care and counseling was 50 minutes.    History of present illness:  On 8/1/2022, the patient was self-referred and seen by Kelly Gupta at [facility] for a 10-year history of solitary right neck lymphadenopathy and new bilateral neck adenopathy.  Head/neck soft tissue ultrasound revealed increased number of lymph nodes with bilateral posterior cervical lymphadenopathy up to 1.3 cm and a 1.8 cm right suboccipital lymph node.  The provider felt that the ultrasound findings were consistent with a reactive lymph node and recommended patient monitoring with as needed follow-up.    On 9/20/2022, the patient was seen by his primary care provider with more recent bilateral inguinal lymphadenopathy with 3 cm left and 2 cm right inguinal lymph nodes palpated that were nontender and mobile.  CBC differential revealed a white count 7700, hemoglobin is 14.0, hematocrit 46.8, MCV 68, RDW 17.0% with a normal platelet count and differential.  Similar findings  "were seen in a previous 6/24/2022 CBC/platelet count.    He has had a cat in his home for just several weeks.  He denies any fever, chills or intentional weight loss.    Past medical history:  The patient has been evaluated for bilateral inguinal hernias but has otherwise been healthy.  He does not smoke or use alcohol although he does use electronic cigarettes.  He does use weed.    Family history:  His mother has endometriosis his father is in good health.  He has a full brother and a half brother who are in good health.  Family history is unremarkable.    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  He is single and lives with his girlfriend Yumiko who is a [place] pulmonary rehab specialist.  They have had a dog.  He is not a  and denies foreign travel.  Medications:  No current outpatient medications on file.     No current facility-administered medications for this visit.       Allergies:  No Known Allergies    Review of systems:  Except as noted in the note above, the patient denies headaches, diplopia, hearing loss or dizziness; dyspnea, cough, hemoptysis, pleurisy; chest pain/pressure, palpitations, lightheadedness; anorexia, nausea, vomiting, abdominal pain, diarrhea, constipation, melena or rectal bleeding; dysuria, frequency, nocturia, blood in the urine; fever, chills, sweats; tingling, numbness, loss of balance; insomnia, depression, anxiety.    Physical examination:  /75 (BP Location: Left arm, Patient Position: Sitting, Cuff Size: Adult Regular)   Pulse 79   Ht 1.727 m (5' 8\")   Wt 76.6 kg (168 lb 12.8 oz)   SpO2 99%   BMI 25.67 kg/m       The patient is alert and oriented. Throat and oral mucosa are normal-appearing. Thyroid gland is not enlarged.  Bilateral posterior cervical lymphadenopathy is palpated measuring 4 to 6 mm.  There is a more prominent 1 cm right superior posterior cervical lymph node noted.  Bilateral 1 -1.5 cm inguinal and femoral lymphadenopathy is noted.  " "Lungs are clear to auscultation and percussion without rales, wheezes or rubs; heart rhythm is regular, heart sounds are without murmurs or gallops; the abdomen is soft and flat without hepatosplenomegaly, masses, ascites or tenderness.; extremities and skin reveal no unusual skin lesions, joint swelling or edema; there is a scaly erythematous rash over his hairline posteriorly for which he has been seen by a skin provider on multiple occasions.  Examination of the testes and scrotum were unremarkable.      Petros Jones MD              Oncology Rooming Note    September 29, 2022 2:23 PM   Arie Fuentes is a 22 year old male who presents for:    Chief Complaint   Patient presents with     Oncology Clinic Visit     New Consult,  Lymphadenopathy,  Referred byMarcelino Cook PA-C     Initial Vitals: /75 (BP Location: Left arm, Patient Position: Sitting, Cuff Size: Adult Regular)   Pulse 79   Ht 1.727 m (5' 8\")   Wt 76.6 kg (168 lb 12.8 oz)   SpO2 99%   BMI 25.67 kg/m   Estimated body mass index is 25.67 kg/m  as calculated from the following:    Height as of this encounter: 1.727 m (5' 8\").    Weight as of this encounter: 76.6 kg (168 lb 12.8 oz). Body surface area is 1.92 meters squared.  No Pain (0) Comment: Data Unavailable   No LMP for male patient.  Allergies reviewed: Yes  Medications reviewed: Yes    Medications: Medication refills not needed today.  Pharmacy name entered into [facility]:    [email or web address][id number] [place] [address] [address] [facility] [address]  [email or web address][id number] [address] AT [facility]    Clinical concerns: New Consult    AMIRA Chandler MD  "

## 2022-09-29 NOTE — LETTER
9/29/2022         RE: Arie Fuentes  [address]   [address]  [facility] [address]        Dear Colleague,    Thank you for referring your patient, Arie Fuentes, to the [facility] [place]. Please see a copy of my visit note below.    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    Impression:  1. Mild, non-pathologic widespread lymphadenopathy, but associated with 6 month history of night sweats  2. Severe microcytosis; suspect thalassemia; r/o latent iron deficiency    Recommendation, plan, instructions:  1. CBC, smear, CMP, ESR, LDH, Hgb electrophoresis with reflex, iron/TIBC, ferritin  2. CT chest, abdomen and pelvis with and wo contrast  3.  I will call the patient back with test results and discussion about what to do next.    Time with patient including review, documentation, history, examination, coordination of care and counseling was 50 minutes.    History of present illness:  On 8/1/2022, the patient was self-referred and seen by Kelly Gupta at [facility] for a 10-year history of solitary right neck lymphadenopathy and new bilateral neck adenopathy.  Head/neck soft tissue ultrasound revealed increased number of lymph nodes with bilateral posterior cervical lymphadenopathy up to 1.3 cm and a 1.8 cm right suboccipital lymph node.  The provider felt that the ultrasound findings were consistent with a reactive lymph node and recommended patient monitoring with as needed follow-up.    On 9/20/2022, the patient was seen by his primary care provider with more recent bilateral inguinal lymphadenopathy with 3 cm left and 2 cm right inguinal lymph nodes palpated that were nontender and mobile.   "CBC differential revealed a white count 7700, hemoglobin is 14.0, hematocrit 46.8, MCV 68, RDW 17.0% with a normal platelet count and differential.  Similar findings were seen in a previous 6/24/2022 CBC/platelet count.    He has had a cat in his home for just several weeks.  He denies any fever, chills or intentional weight loss.    Past medical history:  The patient has been evaluated for bilateral inguinal hernias but has otherwise been healthy.  He does not smoke or use alcohol although he does use electronic cigarettes.  He does use weed.    Family history:  His mother has endometriosis his father is in good health.  He has a full brother and a half brother who are in good health.  Family history is unremarkable.    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  He is single and lives with his girlfriend Yumiko who is a [place] pulmonary rehab specialist.  They have had a dog.  He is not a  and denies foreign travel.  Medications:  No current outpatient medications on file.     No current facility-administered medications for this visit.       Allergies:  No Known Allergies    Review of systems:  Except as noted in the note above, the patient denies headaches, diplopia, hearing loss or dizziness; dyspnea, cough, hemoptysis, pleurisy; chest pain/pressure, palpitations, lightheadedness; anorexia, nausea, vomiting, abdominal pain, diarrhea, constipation, melena or rectal bleeding; dysuria, frequency, nocturia, blood in the urine; fever, chills, sweats; tingling, numbness, loss of balance; insomnia, depression, anxiety.    Physical examination:  /75 (BP Location: Left arm, Patient Position: Sitting, Cuff Size: Adult Regular)   Pulse 79   Ht 1.727 m (5' 8\")   Wt 76.6 kg (168 lb 12.8 oz)   SpO2 99%   BMI 25.67 kg/m       The patient is alert and oriented. Throat and oral mucosa are normal-appearing. Thyroid gland is not enlarged.  Bilateral posterior cervical lymphadenopathy is palpated measuring 4 " "to 6 mm.  There is a more prominent 1 cm right superior posterior cervical lymph node noted.  Bilateral 1 -1.5 cm inguinal and femoral lymphadenopathy is noted.  Lungs are clear to auscultation and percussion without rales, wheezes or rubs; heart rhythm is regular, heart sounds are without murmurs or gallops; the abdomen is soft and flat without hepatosplenomegaly, masses, ascites or tenderness.; extremities and skin reveal no unusual skin lesions, joint swelling or edema; there is a scaly erythematous rash over his hairline posteriorly for which he has been seen by a skin provider on multiple occasions.  Examination of the testes and scrotum were unremarkable.      Petros Jones MD              Oncology Rooming Note    September 29, 2022 2:23 PM   Arie Fuentes is a 22 year old male who presents for:    Chief Complaint   Patient presents with     Oncology Clinic Visit     New Consult,  Lymphadenopathy,  Referred byMarcelino Cook PA-C     Initial Vitals: /75 (BP Location: Left arm, Patient Position: Sitting, Cuff Size: Adult Regular)   Pulse 79   Ht 1.727 m (5' 8\")   Wt 76.6 kg (168 lb 12.8 oz)   SpO2 99%   BMI 25.67 kg/m   Estimated body mass index is 25.67 kg/m  as calculated from the following:    Height as of this encounter: 1.727 m (5' 8\").    Weight as of this encounter: 76.6 kg (168 lb 12.8 oz). Body surface area is 1.92 meters squared.  No Pain (0) Comment: Data Unavailable   No LMP for male patient.  Allergies reviewed: Yes  Medications reviewed: Yes    Medications: Medication refills not needed today.  Pharmacy name entered into [employer]:    [facility] #[id number] - [place], [place] - [facility] [address] [facility] [address]  [facility] #[id number] - [place] [address] [address] AT [facility]    Clinical concerns: New Consult    Mona Miller MA                Again, thank you for allowing me to participate in the care of your patient.  "       Sincerely,        Petros Jones MD

## 2022-09-30 LAB
FERRITIN SERPL-MCNC: 248 NG/ML (ref 31–409)
PATH REPORT.COMMENTS IMP SPEC: NORMAL
PATH REPORT.COMMENTS IMP SPEC: NORMAL
PATH REPORT.FINAL DX SPEC: NORMAL
PATH REPORT.RELEVANT HX SPEC: NORMAL

## 2022-10-03 ENCOUNTER — HEALTH MAINTENANCE LETTER (OUTPATIENT)
Age: 22
End: 2022-10-03

## 2022-10-03 LAB
HGB A1 MFR BLD: 96.9 %
HGB A2 MFR BLD: 2.6 %
HGB C MFR BLD: 0 %
HGB E MFR BLD: 0 %
HGB F MFR BLD: 0.5 %
HGB FRACT BLD ELPH-IMP: NORMAL
HGB OTHER MFR BLD: 0 %
HGB S BLD QL SOLY: NORMAL
HGB S MFR BLD: 0 %
PATH INTERP BLD-IMP: NORMAL

## 2022-10-07 ENCOUNTER — LAB (OUTPATIENT)
Dept: INFUSION THERAPY | Facility: CLINIC | Age: 22
End: 2022-10-07
Attending: INTERNAL MEDICINE
Payer: COMMERCIAL

## 2022-10-07 ENCOUNTER — HOSPITAL ENCOUNTER (OUTPATIENT)
Dept: CT IMAGING | Facility: CLINIC | Age: 22
Discharge: HOME | End: 2022-10-07
Attending: INTERNAL MEDICINE
Payer: COMMERCIAL

## 2022-10-07 DIAGNOSIS — R59.0 INGUINAL LYMPHADENOPATHY: ICD-10-CM

## 2022-10-07 DIAGNOSIS — R71.8 MICROCYTOSIS: ICD-10-CM

## 2022-10-07 DIAGNOSIS — R61 NIGHT SWEATS: ICD-10-CM

## 2022-10-07 DIAGNOSIS — R59.1 LYMPHADENOPATHY: ICD-10-CM

## 2022-10-07 LAB
HOLD SPECIMEN: NORMAL
Lab: NORMAL
PERFORMING LABORATORY: NORMAL
SPECIMEN STATUS: NORMAL
TEST NAME: NORMAL

## 2022-10-07 PROCEDURE — 36415 COLL VENOUS BLD VENIPUNCTURE: CPT

## 2022-10-07 PROCEDURE — 84999 UNLISTED CHEMISTRY PROCEDURE: CPT

## 2022-10-07 PROCEDURE — 81269 HBA1/HBA2 GENE DUP/DEL VRNTS: CPT

## 2022-10-07 PROCEDURE — 74177 CT ABD & PELVIS W/CONTRAST: CPT

## 2022-10-07 PROCEDURE — 250N000011 HC RX IP 250 OP 636: Performed by: INTERNAL MEDICINE

## 2022-10-07 RX ORDER — IOPAMIDOL 755 MG/ML
100 INJECTION, SOLUTION INTRAVASCULAR ONCE
Status: COMPLETED | OUTPATIENT
Start: 2022-10-07 | End: 2022-10-07

## 2022-10-07 RX ADMIN — IOPAMIDOL 100 ML: 755 INJECTION, SOLUTION INTRAVENOUS at 08:42

## 2022-10-15 LAB — MISCELLANEOUS TEST 1 (ARUP): ABNORMAL

## 2022-10-17 ENCOUNTER — TELEPHONE (OUTPATIENT)
Dept: ONCOLOGY | Facility: CLINIC | Age: 22
End: 2022-10-17

## 2022-10-25 ENCOUNTER — APPOINTMENT (OUTPATIENT)
Dept: URBAN - METROPOLITAN AREA CLINIC 260 | Age: 22
Setting detail: DERMATOLOGY
End: 2022-10-26

## 2022-10-25 ENCOUNTER — HOSPITAL ENCOUNTER (OUTPATIENT)
Dept: MRI IMAGING | Facility: CLINIC | Age: 22
Discharge: HOME | End: 2022-10-25
Attending: INTERNAL MEDICINE | Admitting: INTERNAL MEDICINE
Payer: COMMERCIAL

## 2022-10-25 VITALS — WEIGHT: 160 LBS | HEIGHT: 68 IN

## 2022-10-25 DIAGNOSIS — L44.8 OTHER SPECIFIED PAPULOSQUAMOUS DISORDERS: ICD-10-CM

## 2022-10-25 DIAGNOSIS — K52.9 JEJUNAL INFLAMMATION: ICD-10-CM

## 2022-10-25 DIAGNOSIS — L23.2 ALLERGIC CONTACT DERMATITIS DUE TO COSMETICS: ICD-10-CM

## 2022-10-25 PROCEDURE — OTHER PRESCRIPTION MEDICATION MANAGEMENT: OTHER

## 2022-10-25 PROCEDURE — OTHER MIPS QUALITY: OTHER

## 2022-10-25 PROCEDURE — OTHER PRESCRIPTION: OTHER

## 2022-10-25 PROCEDURE — 99214 OFFICE O/P EST MOD 30 MIN: CPT

## 2022-10-25 PROCEDURE — OTHER COUNSELING: OTHER

## 2022-10-25 PROCEDURE — A9585 GADOBUTROL INJECTION: HCPCS | Performed by: INTERNAL MEDICINE

## 2022-10-25 PROCEDURE — 72197 MRI PELVIS W/O & W/DYE: CPT

## 2022-10-25 PROCEDURE — 250N000011 HC RX IP 250 OP 636: Performed by: INTERNAL MEDICINE

## 2022-10-25 PROCEDURE — OTHER ADDITIONAL NOTES: OTHER

## 2022-10-25 PROCEDURE — 255N000002 HC RX 255 OP 636: Performed by: INTERNAL MEDICINE

## 2022-10-25 PROCEDURE — 74183 MRI ABD W/O CNTR FLWD CNTR: CPT

## 2022-10-25 RX ORDER — FLUCONAZOLE 150 MG/1
150MG TABLET ORAL
Qty: 4 | Refills: 0 | Status: ERX | COMMUNITY
Start: 2022-10-25

## 2022-10-25 RX ORDER — CEPHALEXIN 500 MG/1
500MG TABLET ORAL BID
Qty: 28 | Refills: 0 | Status: ERX

## 2022-10-25 RX ORDER — CLOBETASOL PROPIONATE 0.5 MG/ML
0.05% SOLUTION TOPICAL QHS
Qty: 50 | Refills: 1 | Status: ERX

## 2022-10-25 RX ORDER — GADOBUTROL 604.72 MG/ML
8 INJECTION INTRAVENOUS ONCE
Status: COMPLETED | OUTPATIENT
Start: 2022-10-25 | End: 2022-10-25

## 2022-10-25 RX ADMIN — GADOBUTROL 8 ML: 604.72 INJECTION INTRAVENOUS at 08:53

## 2022-10-25 RX ADMIN — GLUCAGON HYDROCHLORIDE 0.5 MG: KIT at 08:42

## 2022-10-25 RX ADMIN — GLUCAGON HYDROCHLORIDE 0.5 MG: KIT at 08:13

## 2022-10-25 ASSESSMENT — LOCATION SIMPLE DESCRIPTION DERM
LOCATION SIMPLE: LEFT AXILLARY VAULT
LOCATION SIMPLE: SCALP
LOCATION SIMPLE: RIGHT AXILLARY VAULT

## 2022-10-25 ASSESSMENT — LOCATION DETAILED DESCRIPTION DERM
LOCATION DETAILED: RIGHT SUPERIOR PARIETAL SCALP
LOCATION DETAILED: LEFT AXILLARY VAULT
LOCATION DETAILED: RIGHT AXILLARY VAULT

## 2022-10-25 ASSESSMENT — LOCATION ZONE DERM
LOCATION ZONE: SCALP
LOCATION ZONE: AXILLAE

## 2022-10-25 ASSESSMENT — SEVERITY ASSESSMENT: HOW SEVERE IS THIS PATIENT'S CONDITION?: SEVERE

## 2022-10-25 NOTE — PROCEDURE: ADDITIONAL NOTES
Detail Level: Simple
Render Risk Assessment In Note?: no
Additional Notes: Continue using a sensitive/aluminum free deoderant daily.

## 2022-10-25 NOTE — PROCEDURE: PRESCRIPTION MEDICATION MANAGEMENT
Detail Level: Simple
Initiate Treatment: Fluconazole 150mg 1 pill once weekly x 4 weeks
Plan: Follow up in 1 month for recheck
Continue Regimen: Clobetosol 0.05% scalp solution BID and cephalexin BID x 14 days\\nOlive or coconut oil to scales 15 minutes prior to shower in order to soften and loosen persistent scaling.
Render In Strict Bullet Format?: No

## 2022-12-06 ENCOUNTER — APPOINTMENT (OUTPATIENT)
Dept: URBAN - METROPOLITAN AREA CLINIC 260 | Age: 22
Setting detail: DERMATOLOGY
End: 2022-12-07

## 2022-12-06 DIAGNOSIS — L21.8 OTHER SEBORRHEIC DERMATITIS: ICD-10-CM

## 2022-12-06 PROCEDURE — 99214 OFFICE O/P EST MOD 30 MIN: CPT

## 2022-12-06 PROCEDURE — OTHER COUNSELING: OTHER

## 2022-12-06 PROCEDURE — OTHER PRESCRIPTION MEDICATION MANAGEMENT: OTHER

## 2022-12-06 PROCEDURE — OTHER PRESCRIPTION: OTHER

## 2022-12-06 PROCEDURE — OTHER MIPS QUALITY: OTHER

## 2022-12-06 PROCEDURE — OTHER ADDITIONAL NOTES: OTHER

## 2022-12-06 RX ORDER — KETOCONAZOLE 20 MG/ML
SHAMPOO, SUSPENSION TOPICAL
Qty: 120 | Refills: 2 | Status: ERX | COMMUNITY
Start: 2022-12-06

## 2022-12-06 RX ORDER — FLUOCINONIDE 0.5 MG/ML
SOLUTION TOPICAL
Qty: 60 | Refills: 2 | Status: ERX | COMMUNITY
Start: 2022-12-06

## 2022-12-06 RX ORDER — CLOBETASOL PROPIONATE 0.5 MG/ML
0.05% SOLUTION TOPICAL QHS
Qty: 50 | Refills: 2 | Status: ERX

## 2022-12-06 ASSESSMENT — LOCATION DETAILED DESCRIPTION DERM: LOCATION DETAILED: POSTERIOR MID-PARIETAL SCALP

## 2022-12-06 ASSESSMENT — LOCATION SIMPLE DESCRIPTION DERM: LOCATION SIMPLE: POSTERIOR SCALP

## 2022-12-06 ASSESSMENT — LOCATION ZONE DERM: LOCATION ZONE: SCALP

## 2022-12-06 ASSESSMENT — SEVERITY ASSESSMENT: HOW SEVERE IS THIS PATIENT'S CONDITION?: MILD

## 2022-12-06 NOTE — PROCEDURE: PRESCRIPTION MEDICATION MANAGEMENT
Initiate Treatment: Clobetasol solution to scalp qhs + Fluocinonide qhs for beard + Ketoconazole shampoo daily for 3 weeks until resolved.
Detail Level: Simple
Plan: Return in 2 months for recheck
Render In Strict Bullet Format?: No
Discontinue Regimen: Cephalexin + Fluconazole

## 2022-12-06 NOTE — PROCEDURE: ADDITIONAL NOTES
Detail Level: Simple
Render Risk Assessment In Note?: no
Additional Notes: Patient appears much improved but he is still flaking. He was unable to  the Clobetasol 0.05% solution after his last visit so we are resending this to the pharmacy along with two new medications.

## 2023-01-10 ENCOUNTER — APPOINTMENT (OUTPATIENT)
Dept: URBAN - METROPOLITAN AREA CLINIC 260 | Age: 23
Setting detail: DERMATOLOGY
End: 2023-01-10

## 2023-01-25 ENCOUNTER — APPOINTMENT (OUTPATIENT)
Dept: URBAN - METROPOLITAN AREA CLINIC 260 | Age: 23
Setting detail: DERMATOLOGY
End: 2023-01-29

## 2023-01-25 VITALS — HEIGHT: 68 IN | WEIGHT: 175 LBS

## 2023-01-25 DIAGNOSIS — L44.8 OTHER SPECIFIED PAPULOSQUAMOUS DISORDERS: ICD-10-CM

## 2023-01-25 PROCEDURE — OTHER MIPS QUALITY: OTHER

## 2023-01-25 PROCEDURE — OTHER PRESCRIPTION MEDICATION MANAGEMENT: OTHER

## 2023-01-25 PROCEDURE — 99214 OFFICE O/P EST MOD 30 MIN: CPT

## 2023-01-25 PROCEDURE — OTHER COUNSELING: OTHER

## 2023-01-25 PROCEDURE — OTHER PRESCRIPTION: OTHER

## 2023-01-25 RX ORDER — KETOCONAZOLE 20 MG/ML
SHAMPOO, SUSPENSION TOPICAL
Qty: 120 | Refills: 2 | Status: ERX

## 2023-01-25 NOTE — PROCEDURE: PRESCRIPTION MEDICATION MANAGEMENT
Continue Regimen: Clobetasol solution to scalp qhs + Fluocinonide qhs for beard + Ketoconazole shampoo daily for 3 weeks until resolved.
Plan: Recommends warm olive oil or coconut oil to the scalp before showering to remove scaling. T/sal shampoo by [employer]. Follow up in 6 months or sooner if not resolved.
Render In Strict Bullet Format?: No
Detail Level: Simple

## 2023-06-04 ENCOUNTER — HEALTH MAINTENANCE LETTER (OUTPATIENT)
Age: 23
End: 2023-06-04

## 2024-07-28 ENCOUNTER — HEALTH MAINTENANCE LETTER (OUTPATIENT)
Age: 24
End: 2024-07-28

## 2025-08-10 ENCOUNTER — HEALTH MAINTENANCE LETTER (OUTPATIENT)
Age: 25
End: 2025-08-10